# Patient Record
Sex: FEMALE | Race: WHITE
[De-identification: names, ages, dates, MRNs, and addresses within clinical notes are randomized per-mention and may not be internally consistent; named-entity substitution may affect disease eponyms.]

---

## 2017-04-06 ENCOUNTER — HOSPITAL ENCOUNTER (OUTPATIENT)
Dept: HOSPITAL 80 - FIMAGING | Age: 77
End: 2017-04-06
Attending: GENERAL ACUTE CARE HOSPITAL
Payer: COMMERCIAL

## 2017-04-06 DIAGNOSIS — Z12.31: Primary | ICD-10-CM

## 2017-04-06 DIAGNOSIS — Z85.3: ICD-10-CM

## 2018-04-09 ENCOUNTER — HOSPITAL ENCOUNTER (OUTPATIENT)
Dept: HOSPITAL 80 - FIMAGING | Age: 78
End: 2018-04-09
Attending: FAMILY MEDICINE
Payer: COMMERCIAL

## 2018-04-09 DIAGNOSIS — Z90.11: ICD-10-CM

## 2018-04-09 DIAGNOSIS — Z85.3: ICD-10-CM

## 2018-04-09 DIAGNOSIS — Z12.31: Primary | ICD-10-CM

## 2018-09-08 ENCOUNTER — HOSPITAL ENCOUNTER (OUTPATIENT)
Dept: HOSPITAL 80 - FED | Age: 78
Setting detail: OBSERVATION
LOS: 1 days | Discharge: LEFT BEFORE DISCHARGE | End: 2018-09-09
Attending: HOSPITALIST | Admitting: HOSPITALIST
Payer: COMMERCIAL

## 2018-09-08 DIAGNOSIS — I10: ICD-10-CM

## 2018-09-08 DIAGNOSIS — R41.841: ICD-10-CM

## 2018-09-08 DIAGNOSIS — I60.7: Primary | ICD-10-CM

## 2018-09-08 DIAGNOSIS — R20.2: ICD-10-CM

## 2018-09-08 DIAGNOSIS — G83.24: ICD-10-CM

## 2018-09-08 LAB — PLATELET # BLD: 253 10^3/UL (ref 150–400)

## 2018-09-08 PROCEDURE — 70549 MR ANGIOGRAPH NECK W/O&W/DYE: CPT

## 2018-09-08 PROCEDURE — A9585 GADOBUTROL INJECTION: HCPCS

## 2018-09-08 PROCEDURE — 99285 EMERGENCY DEPT VISIT HI MDM: CPT

## 2018-09-08 PROCEDURE — G0378 HOSPITAL OBSERVATION PER HR: HCPCS

## 2018-09-08 PROCEDURE — 70551 MRI BRAIN STEM W/O DYE: CPT

## 2018-09-08 PROCEDURE — 70450 CT HEAD/BRAIN W/O DYE: CPT

## 2018-09-08 PROCEDURE — 97161 PT EVAL LOW COMPLEX 20 MIN: CPT

## 2018-09-08 PROCEDURE — 70544 MR ANGIOGRAPHY HEAD W/O DYE: CPT

## 2018-09-08 PROCEDURE — 93005 ELECTROCARDIOGRAM TRACING: CPT

## 2018-09-08 RX ADMIN — LISINOPRIL SCH MG: 2.5 TABLET ORAL at 17:30

## 2018-09-08 NOTE — EDPHY
H & P


Stated Complaint: Lt arm numbness yesterday lasted approx 1 hour.  No C/o today


Time Seen by Provider: 18 12:27


HPI/ROS: 





Chief Complaint:  Left arm numbness





HPI:  78-year-old woman with no significant medical history had an episode 2 

days ago of numbness in her left forearm and inability to use her left hand.  

This lasted for about 5 min.  Started while she was vacuuming.  She is right-

handed.  She then returned to normal.  Patient had another episode yesterday 

which lasted about the same amount of time.  She does not recall what she was 

doing at that time.  She has not had any symptoms for the last 24 hr.  She 

called her primary care physician's office who instructed her to come to the 

hospital.  Patient states her grandfather  of a stroke years ago.  Denies 

any other medical history.  Takes no medicines.  No recent travel.  No other 

numbness or weakness.  No chest pain shortness of breath.  No fevers or chills.

  No cough.





ROS:  10 systems were reviewed and were negative except those elements noted in 

the HPI.





PMH:  Denies





Social History: No smoking, occasional alcohol,  no recreational drug use





Family History: non-contributory





Physical Exam:


Gen: Awake, Alert, No Distress


HEENT:  


     Nose: no rhinorrhea


     Eyes: PERRLA, EOMI


     Mouth: Moist mucosa 


Neck: Supple, no JVD


Chest: nontender, lungs clear to auscultation


Heart: S1, S2 normal, no murmur


Abd: Soft, non-tender, no guarding


Back: no CVA tenderness, no midline tenderness 


Ext: no edema, non-tender


Skin: no rash


Neuro: CN II-XII intact, Sensation grossly intact, Strength 5/5 in bilateral 

upper and lower extremities, normal finger-nose, normal heel-shin, no facial 

asymmetry





- Personal History


Current Tetanus/Diphtheria Vaccine: Yes


Current Tetanus Diphtheria and Acellular Pertussis (TDAP): Yes





- Medical/Surgical History


Hx Asthma: No


Hx Chronic Respiratory Disease: No


Hx Diabetes: No


Hx Cardiac Disease: No


Hx Renal Disease: No


Hx Cirrhosis: No


Hx Alcoholism: No


Hx HIV/AIDS: No


Hx Splenectomy or Spleen Trauma: No


Other PMH: Breast CA





- Social History


Smoking Status: Never smoked


Constitutional: 


 Initial Vital Signs











Temperature (C)  36.7 C   09/08/18 11:39


 


Heart Rate  70   18 11:39


 


Respiratory Rate  16   18 11:39


 


Blood Pressure  171/85 H  18 11:39


 


O2 Sat (%)  93   18 11:39








 











O2 Delivery Mode               Room Air














Allergies/Adverse Reactions: 


 





No Known Allergies Allergy (Unverified 18 11:39)


 








Home Medications: 














 Medication  Instructions  Recorded


 


Herbals/Supplements -Info Only 1 ea PO DAILY 18


 


Multivitamins [Multivitamin (*)] 1 each PO DAILY 18














Medical Decision Making





- Diagnostics


Imaging: Discussed imaging studies w/ On call Radiologist


ED Course/Re-evaluation: 





CT scan is negative.  Patient is asymptomatic.  Discussed with Glenys Del Cid, 

hospitalist.  Will admit for further evaluation.  She is requesting CT 

angiogram of the head neck.





- Data Points


Laboratory Results: 


 Laboratory Results





 18 11:35 





 18 11:35 








Medications Given: 


 








Discontinued Medications





Aspirin Buffered (Aspirin Ec)  325 mg PO DAILY [facility]


   Stop: 19 14:29


   Last Admin: 18 17:35 Dose:  Not Given


Lisinopril (Zestril)  2.5 mg PO DAILY [facility]


   Stop: 19 16:44


   Last Admin: 18 09:31 Dose:  Not Given


Multivitamins (Tab-A-Sharlene)  1 each PO DAILY [facility]


   Stop: 19 08:59


   Last Admin: 18 09:31 Dose:  Not Given








Departure





- Departure


Disposition: [facility]


Clinical Impression: 


 Transient cerebral ischemia





Condition: Fair

## 2018-09-08 NOTE — CPEKG
Test Reason : OPEN

Blood Pressure : ***/*** mmHG

Vent. Rate : 074 BPM     Atrial Rate : 063 BPM

   P-R Int : 212 ms          QRS Dur : 113 ms

    QT Int : 428 ms       P-R-T Axes : 071 057 023 degrees

   QTc Int : 475 ms

 

Sinus rhythm

Ventricular premature complex

Borderline prolonged ID interval

Probable left atrial enlargement

Nonspecific T abnormalities, anterior leads

Nonspecific intraventricular conduction delay

Confirmed by Rubin Mann ([id number]) on 9/8/2018 8:50:25 PM

 

Referred By:             Confirmed By:Rubin Mann

## 2018-09-08 NOTE — PDGENHP
History and Physical





- Chief Complaint


Acute paresthesia





- History of Present Illness


Primary care provider:  Dr. Asa Medel





HPI:  78-year-old female presents with acute paresthesias characterized as 

unusual sensation located on the ventral aspect of her left forearm extending 

from the elbow to the wrist, with associated left hand paresis and difficulty 

moving digits 3 and 4.  Patient reports onset of symptoms 2 days prior, 

duration of symptoms approximately 5 min.  She reports it occurred while she 

was vacuuming and she does not recall sustaining any trauma to the left upper 

extremity or sleeping on improperly.  After the resolution of symptoms, she re-

experience them 1 day following that, and has been 24 hr since she has 

experienced symptoms at this time.  She contacted her primary care provider 

office seeking an outpatient appointment, and she was instructed to come to the 

emergency department.  She reports that she has never experienced these 

physical symptoms before, and she denies any posterior neck pain or headache.  

She does endorse that further past couple months she has experienced word-

finding difficulty, pervasive enough that she is unable to recall the names of 

friends or places where she has lived.





History Information





- Allergies/Home Medication List


Allergies/Adverse Reactions: 








No Known Allergies Allergy (Unverified 09/08/18 11:39)


 





Home Medications: 








Aspirin [Aspirin 81mg (*)] 81 mg PO Q2D 09/08/18 [Last Taken 09/06/18]


Herbals/Supplements -Info Only 1 ea PO DAILY 09/08/18 [Last Taken Unknown]


Ibuprofen [Motrin (*)] 200 - 400 mg PO Q4-6PRN PRN 09/08/18 [Last Taken Unknown]


Multivitamins [Multivitamin (*)] 1 each PO DAILY 09/08/18 [Last Taken Unknown]





I have personally reviewed and updated: family history, medical history, social 

history, surgical history





- Past Medical History


hypertension


Additional medical history: Skin cancer left side of nose.  Breast cancer with 

radiation therapy 10 years ago





- Surgical History


Additional surgical history: Umbilical hernia repair





- Family History


Additional family history: Second-degree relative with stroke, no family 

history of myocardial infarction or cerebral aneurysms





- Social History


Smoking Status: Never smoked


Alcohol Use: Occasionally


Drug Use: None


Additional social history: Lives independently, relocated to [place] from 

[place] 2 years ago to be closer to family





Review of Systems


Review of Systems: 





ROS: 10pt was reviewed & negative except for what was stated in HPI & below


Neurological: Reports: other (Paresthesias and paresis left upper extremity, 

word-finding difficulty)





Physical Exam


Physical Exam: 

















Temp Pulse Resp BP Pulse Ox


 


 36.7 C   70   16   171/85 H  93 


 


 09/08/18 11:39  09/08/18 11:39  09/08/18 11:39  09/08/18 11:39  09/08/18 11:39











Constitutional: no apparent distress, appears nourished, not in pain


Eyes: PERRL, anicteric sclera, EOMI


Ears, Nose, Mouth, Throat: moist mucous membranes, hearing normal, ears appear 

normal, no oral mucosal ulcers


Cardiovascular: regular rate and rhythym, no murmur, rub, or gallop, No carotid 

bruit, No edema


Respiratory: no respiratory distress, no rales or rhonchi, clear to auscultation


Gastrointestinal: normoactive bowel sounds, soft, non-tender abdomen, no 

palpable masses, No distension


Genitourinary: no renal bruits


Skin: No abrasion (Left upper extremity), No rash


Musculoskeletal: other (Full range of motion left wrist and left elbow without 

any pain elicited)


Neurologic: CN II-XII Intact, other (Subjective paresthesia ventral surface 

left upper extremity, alert awake oriented times 2, patient unable to say her 

location, demonstrates significant word-finding difficulty and distress with 

good insight), No weakness (Motor strength 5/5 bilateral upper and lower 

extremities)


Psychiatric: interacting appropriately, not anxious, No agitated





Lab Data & Imaging Review





 09/08/18 11:35





 09/08/18 11:35














WBC  5.17 10^3/uL (3.80-9.50)   09/08/18  11:35    


 


RBC  5.14 10^6/uL (4.18-5.33)   09/08/18  11:35    


 


Hgb  15.3 g/dL (12.6-16.3)   09/08/18  11:35    


 


Hct  44.5 % (38.0-47.0)   09/08/18  11:35    


 


MCV  86.6 fL (81.5-99.8)   09/08/18  11:35    


 


MCH  29.8 pg (27.9-34.1)   09/08/18  11:35    


 


MCHC  34.4 g/dL (32.4-36.7)   09/08/18  11:35    


 


RDW  13.3 % (11.5-15.2)   09/08/18  11:35    


 


Plt Count  253 10^3/uL (150-400)   09/08/18  11:35    


 


MPV  8.8 fL (8.7-11.7)   09/08/18  11:35    


 


Neut % (Auto)  66.7 % (39.3-74.2)   09/08/18  11:35    


 


Lymph % (Auto)  22.8 % (15.0-45.0)   09/08/18  11:35    


 


Mono % (Auto)  6.8 % (4.5-13.0)   09/08/18  11:35    


 


Eos % (Auto)  2.5 % (0.6-7.6)   09/08/18  11:35    


 


Baso % (Auto)  0.8 % (0.3-1.7)   09/08/18  11:35    


 


Nucleat RBC Rel Count  0.0 % (0.0-0.2)   09/08/18  11:35    


 


Absolute Neuts (auto)  3.45 10^3/uL (1.70-6.50)   09/08/18  11:35    


 


Absolute Lymphs (auto)  1.18 10^3/uL (1.00-3.00)   09/08/18  11:35    


 


Absolute Monos (auto)  0.35 10^3/uL (0.30-0.80)   09/08/18  11:35    


 


Absolute Eos (auto)  0.13 10^3/uL (0.03-0.40)   09/08/18  11:35    


 


Absolute Basos (auto)  0.04 10^3/uL (0.02-0.10)   09/08/18  11:35    


 


Absolute Nucleated RBC  0.00 10^3/uL (0-0.01)   09/08/18  11:35    


 


Immature Gran %  0.4 % (0.0-1.1)   09/08/18  11:35    


 


Immature Gran #  0.02 10^3/uL (0.00-0.10)   09/08/18  11:35    


 


Sodium  140 mEq/L (135-145)   09/08/18  11:35    


 


Potassium  4.5 mEq/L (3.3-5.0)   09/08/18  11:35    


 


Chloride  104 mEq/L ()   09/08/18  11:35    


 


Carbon Dioxide  28 mEq/l (22-31)   09/08/18  11:35    


 


Anion Gap  8 mEq/L (8-16)   09/08/18  11:35    


 


BUN  24 mg/dL (7-23)  H  09/08/18  11:35    


 


Creatinine  0.9 mg/dL (0.6-1.0)   09/08/18  11:35    


 


Estimated GFR  60   09/08/18  11:35    


 


Glucose  88 mg/dL ()   09/08/18  11:35    


 


Calcium  10.1 mg/dL (8.5-10.4)   09/08/18  11:35    








Visualized and Interpreted imaging results: Yes


Interpretation: Head CT demonstrating atrophy, no previous CVA





Assessment & Plan


Assessment: 








78-year-old female presents with acute paresthesias and paresis suggestive of 

TIA versus CVA








Plan: 





1.  Paresis and paresthesia.  Acute, new problem this provider, further workup 

indicated.  Potential diagnoses include TIA versus CVA versus a posterior 

cervical cord compression, given the patient's present and persistent nature of 

word-finding difficulties, I suspect the patient may have experienced subacute 

CVA, but dementia is also within the differential, particularly if she were to 

have vascular dementia


-patient declined head CT with IV contrast but she has elected for MRA of head 

and neck, MRI


-she is agreeable to echocardiogram


-lipid panel, hemoglobin A1c


-monitor on telemetry given potential for predisposing AFib, with patient 

reporting a history of right chest palpitations evaluated by her PCP but no 

formal cardiology evaluation the outpatient setting


-aspirin 325


-get neuro consultation tomorrow after above studies obtained





2. High blood pressure.  Patient denies history of hypertension, her systolic 

blood pressure is currently 170, will monitor, may be elevated in the setting 

of CVA





Diet.  Regular after swallow eval





Prophylaxis.  High risk patient, SCDs, hold pharm given possible CVA





Code.  Full





Disposition.  Anticipated discharge is 9/9, pending further workup of above.





Patient's case discussed with Dr. Paul Alva, he and I both agree that further 

neurovascular workup is appropriate.

## 2018-09-09 VITALS — SYSTOLIC BLOOD PRESSURE: 132 MMHG | DIASTOLIC BLOOD PRESSURE: 71 MMHG

## 2018-09-09 RX ADMIN — LISINOPRIL SCH: 2.5 TABLET ORAL at 09:31

## 2018-09-09 NOTE — PDDCSUM
Discharge Summary


Discharge Summary: 


DISCHARGE SUMMARY





FOLLOW-UP ITEMS: 


1. Perform mini-mental status exam as an outpatient PCP office


2. Recommend driving exam and suspension of license until patient is able to 

perform


2. Recommend outpatient neurology consultation 





DATE OF ADMISSION:  9/8/2018


DATE OF DISCHARGE:  9/9/2018





DISCHARGE DIAGNOSES:


1. Suspected amyloid angiopathy and subacute subarachnoid hemorrhages


2. Acute paresis, paresthesia, word-finding difficulties 





CONSULTATIONS:


Neurosurgery, patient left prior to Neurology consultation





PROCEDURES / IMAGING:


Brain MRI demonstrating diffuse bilateral hemosiderin deposits potentially 

consistent with subacute or old subarachnoid hemorrhages without acute CVA


MRA of head and neck demonstrating no cerebral aneurysms





CHIEF COMPLAINT:


Acute left upper extremity paresthesias and paresis with word-finding difficulty





SUBJECTIVE:


Patient became very distressed and paranoid, leaving against medical advice 

prior to neurology exam





PHYSICAL EXAM ON DISCHARGE:


Systolic blood pressure 130, heart rate 80, afebrile overnight, satting on room 

air, alert awake oriented times 3 to person place and time but requiring 

prompting and demonstrating a very circuitous way of arriving at her answers; 

she has obvious word-finding difficulty, unable to name her address or City 

where she lives, but able to problem solve by getting into her purse and 

locating the address in 1 of her pocket books; moving all 4 extremities; facial 

symmetry; labile emotions, seemingly paranoid, but demonstrates linear and 

directable thinking





LABS ON DISCHARGE:


, A1c pending at time of discharge





HOSPITAL COURSE BY PROBLEM:


The patient presented with left upper extremity paresis and paresthesias of 

abrupt onset 3 days prior to presentation.  She also reported word-finding 

difficulty which has been present for at least a month.  She attempted to see 

her primary care provider, but was triaged to the emergency department for 

evaluation.  The patient initially underwent a TIA versus CVA workup, and brain 

MRI demonstrated diffuse bilateral hemosiderin deposits potentially consistent 

with subacute or old subarachnoid hemorrhages.  MRA of the head and neck were 

otherwise unremarkable.  She was monitored on neuro checks, which demonstrated 

resolution of her paresthesias and paresis, but persistence of her word-finding 

difficulty as well as development of paranoia and distress out of proportion to 

the patient's situation.  She became increasingly agitated and demanded to be 

discharged.  I evaluated the patient and after an extensive conversation, I 

felt like she did have the capacity to make rational decisions, although her 

word-finding difficulty was present and pervasive on exam, revealing that she 

did have intact problem solving capabilities, as she would look for the words 

or answers to questions on collateral material such as her pocketbook which was 

in her purse.  At the time of my evaluation, the patient was cooperative and 

agreed to a Neurology consultation prior to discharge, as I shared with her the 

diagnosis of subacute subarachnoid hemorrhages which could either be secondary 

to amyloid angiopathy verses vasculitis, as suggested by Neurosurgery.  Assured 

with the patient that there was no recommended neurosurgical intervention, but 

that neurology evaluation was necessary to help her determine the next best 

steps.  After agreeing to this consultation prior to discharge, the patient 

then subsequently decided to leave against medical advice, prior to the 

Neurology evaluation.





My suspicion is that the patient has an amyloid angiopathy resulting in 

subacute subarachnoid hemorrhages, resulting in her aforementioned neurologic 

and personality symptoms.  The patient endorsed that she has a sister in 

[facility] who has very similar symptoms, and it is certainly possible that there 

may be a genetic component.  Although vasculitis is within the differential, 

the patient's normal CBC makes this diagnosis somewhat less likely.  That said, 

she should have inflammatory markers and workup in the outpatient setting, if 

she is amenable to outpatient neurology consultation.  She should also have 

close outpatient follow-up with a mini-mental status exam and consideration for 

the involvement of Adult protection Services if the patient decompensates 

further and appears to be unable to manage her situation independently.  I 

would highly recommend the patient not be driving a motorized vehicle for her 

own safety and that of others, as she is at risk for losing her way and 

becoming easily overwhelmed.  I would also highly recommend the patient stop 

taking aspirin every other day, as she does not have an absolute indication for 

this medication and she is at risk for worsening intracranial bleeding.





It should be noted that I was unable to provide the patient with some of these 

aforementioned recommendations, as my intention had been to continue my 

conversation with the patient after she had been seen by Neurology, which was 

consultation she had agreed to during our encounter.  Consequently, I recommend 

that the patient's primary care provider office reach out to the patient 

immediately to arrange outpatient follow-up and  her on these 

recommendations.





DISCHARGE MEDICATIONS:


Please see official discharge medication reconciliation sheet in chart , 

continue home medications with the exception of aspirin.





DISCHARGE INSTRUCTIONS:


Please follow up with her primary care provider as soon as possible.

## 2018-09-09 NOTE — GCON
DATE OF CONSULTATION:  09/09/2018



REASON FOR CONSULTATION:  Acute paraesthesias and confusion, with new cranial 
petechial hemorrhages noted.



HOSPITAL COURSE/HISTORY/MAJOR MEDICAL FINDINGS:  The patient is a 78-year-old 
female, from whom some of this has been taken from the patient's medical record 
as the patient has had some confusion this morning and states that she does not 
fully understand why she was asked to come to the hospital.  Per her record, 
the patient was having some acute paresthesias in her left arm extending from 
her elbow to her wrist.  This had been going on for approximately 2 days prior, 
and each time it would last about 5 minute.  She states that this would occur 
when she was vacuuming and she denied any trauma.  The patient does state that 
she contacted her primary care physician, and she was then directed to come to 
the emergency room.  She had never experienced any arm numbness, tingling or 
pain prior to this.  She denies any neck pain.  She has also noticed that she 
has been having some word-finding difficulty.  The patient denies any headaches
, nausea, vomiting, or dizziness this morning.



REVIEW OF SYSTEMS:  Review of systems is negative other than what is stated in 
the HPI.  Please see pertinent negatives and positives.



HOME MEDICATIONS:  Include aspirin, herbal supplements, ibuprofen, multivitamin.



ALLERGIES:  No known drug allergies.



FAMILY HISTORY:  Most of her family lives in [place].  She denies any history 
of cancer or diabetes.  She does have someone in her family that has had a 
prior stroke.



PAST SURGICAL HISTORY:  The patient states that she had skin cancer removed 
from her nose recently and had an umbilical hernia repair.



PAST MEDICAL HISTORY:  Significant for the skin cancer as well as breast cancer 
with radiation and hypertension.



SOCIAL HISTORY:  The patient is originally from [facility] but she now lives 
independently here in [place].  She has never smoked and she has an occasional 
alcoholic beverage.



PHYSICAL EXAMINATION:  VITAL SIGNS:  BP is 132/71, heart rate is 87, she is 91% 
on room air, temperature is 37.7.  GENERAL:  The patient is in no acute 
distress.  She is alert and she is oriented to year, but throughout the 
conversation with the patient, she does perseverate and is not able to fully 
recall the full reason that she was sent to the hospital nor some detailed 
history questionnaires.  She had tried to order breakfast; however, she states 
that when she called for breakfast that they were closed.  All of her 
statements are not fully appropriate.  HEENT:  RANDY.  EOMI.  EXTREMITIES:  
The patient is a 5/5 and equal in her bilateral upper and bilateral lower 
extremities including her deltoids, triceps, biceps, wrist flexors, extensors, 
interossei, intrinsic , iliopsoas, hamstrings, quadriceps, plantar flexion, 
dorsiflexion, and EHL.



DIAGNOSTIC REVIEW:  The patient underwent a head CT which demonstrated no acute 
hemorrhage or intracranial abnormality; there is atrophy and nonspecific white 
matter disease. 



There was evidence of a perivascular space versus old lacunar infarct present 
within the right basal ganglia.  There was minimal focal encephalomalacia 
involving the low left paramedian frontal lobe. 



Brain MRI demonstrated multiple diffuse bilateral cerebral hemodesrin deposits 
consistent with subacute to old subarachnoid hemorrhages and cortical 
hemorrhages which may be relative to amyloid angiopathy.  There is severe 
microvascular gliosis and moderate cerebral atrophy. 



MRA of the brain demonstrated findings that were consistent with the prior MRI.
  There was no evidence of aneurysm, vascular malformation, or flow-limiting 
stenosis or occlusion. 



Cervical MRA was also negative for any flow-limiting stenosis, occlusion or 
dissection.



ASSESSMENT AND PLAN:  The patient is a 78-year-old female who presented with 
some left arm intermittent paresthesias that would come and go.  She is not 
having any of that this morning; she does have some confusion, however, that 
was noted throughout my lengthy conversation with the patient.  There is 
evidence of some hemodesrin spots on her MRI which may be consistent with an 
amyloid angiopathy per the radiology reports.  The patient was seen both by Dr. Stewart and myself.  At this point in time, there is no acute neurosurgical 
etiology, no acute neurosurgical treatment recommended; would recommend 
neurology evaluation for further workup.  PT, OT, and SLP cog evaluations.  



The patient was seen by both Dr. Stewart and myself.





Job #:  [id number]

MTDD

## 2018-09-09 NOTE — PDCONSULT
Consultant Note: 





PATIENT LEFT AGAINST MEDICAL ADVICE (AMA)


BECAUSE OF THIS, I WAS UNABLE TO EVALUATE HER

## 2018-09-10 ENCOUNTER — HOSPITAL ENCOUNTER (OUTPATIENT)
Dept: HOSPITAL 80 - FED | Age: 78
Setting detail: OBSERVATION
LOS: 1 days | Discharge: HOME | End: 2018-09-11
Attending: INTERNAL MEDICINE | Admitting: INTERNAL MEDICINE
Payer: COMMERCIAL

## 2018-09-10 DIAGNOSIS — Z92.3: ICD-10-CM

## 2018-09-10 DIAGNOSIS — R93.0: ICD-10-CM

## 2018-09-10 DIAGNOSIS — R20.2: Primary | ICD-10-CM

## 2018-09-10 DIAGNOSIS — R20.0: ICD-10-CM

## 2018-09-10 DIAGNOSIS — Z85.3: ICD-10-CM

## 2018-09-10 DIAGNOSIS — H81.399: ICD-10-CM

## 2018-09-10 LAB
INR PPP: 1.07 (ref 0.83–1.16)
PLATELET # BLD: 239 10^3/UL (ref 150–400)
PROTHROMBIN TIME: 14.1 SEC (ref 12–15)

## 2018-09-10 PROCEDURE — 93306 TTE W/DOPPLER COMPLETE: CPT

## 2018-09-10 PROCEDURE — 97165 OT EVAL LOW COMPLEX 30 MIN: CPT

## 2018-09-10 PROCEDURE — 70450 CT HEAD/BRAIN W/O DYE: CPT

## 2018-09-10 PROCEDURE — 93005 ELECTROCARDIOGRAM TRACING: CPT

## 2018-09-10 PROCEDURE — 97161 PT EVAL LOW COMPLEX 20 MIN: CPT

## 2018-09-10 PROCEDURE — 92523 SPEECH SOUND LANG COMPREHEN: CPT

## 2018-09-10 PROCEDURE — G0378 HOSPITAL OBSERVATION PER HR: HCPCS

## 2018-09-10 PROCEDURE — 96372 THER/PROPH/DIAG INJ SC/IM: CPT

## 2018-09-10 PROCEDURE — 99285 EMERGENCY DEPT VISIT HI MDM: CPT

## 2018-09-10 NOTE — CPEKG
Test Reason : OPEN

Blood Pressure : ***/*** mmHG

Vent. Rate : 066 BPM     Atrial Rate : 066 BPM

   P-R Int : 221 ms          QRS Dur : 103 ms

    QT Int : 411 ms       P-R-T Axes : 066 043 014 degrees

   QTc Int : 431 ms

 

Sinus rhythm

Prolonged AL interval

Probable left atrial enlargement

 

Confirmed by McCollester, Laughlin ([id number]) on 9/10/2018 10:11:00 PM

 

Referred By:             Confirmed By:Laughlin McCollester

## 2018-09-10 NOTE — PDGENHP
History and Physical


History and Physical: 





CC:  Numbness in left arm and in left tongue





HISTORY:  This patient comes into the emergency room today complaining of an 

episode of now resolved numbness in her left arm and hand and left tongue.  She 

actually had been admitted here 2 days ago with pretty much identical symptoms 

that had resolved.  There were 2 episodes on that day of similar symptoms.  

There was no headache, no cardiac symptoms and no significant findings on exam.

  She had examination including CT scan head MRI of the brain MR angio of head 

and neck.  The most concerning finding at the time on imaging was evidence of 

several subacute and/or chronic small hemorrhage areas in the brain, raising a 

question of possible amyloid angiopathy or other cause.  There was atrophy but 

no acute ischemia or other acute changes.  The patient was seen by Neurosurgery 

who did not feel there is a neurosurgical issue.  She was to be seen by 

Neurology but the patient left against medical advice before being seen by 

neurologist.  There were no changes in her medications.  The patient does 

clinically take an aspirin a day.





While out shopping with her son today in a grocery store the patient had onset 

of the same left arm and hand numbness and left tongue numbness.  It is not 

possible for me to clarify from talking is patient whether there was actually 

any weakness but as best I can tell there was no actual weakness in the hand or 

arm.  It sounds like the son was concerned that her speech did not sound quite 

normal, somewhat slurred, but the patient does not recall having any change in 

the sound of her voice or any other difficulty speaking.  She tells me that 

today's episode as well as the episodes 2 days ago all lasted several minutes 

meaning 10-15 minutes, with then complete resolution.  Again today she denies 

headache, change in vision, confusion, palpitations, fever symptoms, or other 

associated changes.





She gives no prior history of stroke or TIA.  She describes what sounds like 1 

past episode of peripheral vertigo that was successfully treated with Epley 

maneuvers.








ROS:  A comprehensive 10 system review revealed no other significant findings








PAST MEDICAL HISTORY:


Peripheral vertigo


Breast cancer status post radiation 10 years out


Umbilical hernia repair


Basal cell cancer of the nose








FAMILY MEDICAL HISTORY:


Second-degree relative with a stroke but no other family history of cardiac 

vascular or cerebrovascular disease





SOCIAL HISTORY:


Lives alone, has family in the area


No tobacco alcohol or street drugs





MEDICATIONS:


The patients list has been reconciled by our clinical pharmacist in the EMR.  I 

have reviewed the list and ordered appropriate medicines.





PHYSICAL EXAMINATION:


Vital Signs:  Mildly hypertensive otherwise normal


Cardiac Monitor:  Sinus





Examination:


General: alert, oriented, good mentation, relaxed


Skin: warm, dry, good color, no rash


HEENT: normal


Neck: no mass or jvd


Resps: relaxed


Lungs: clear breath sounds


Heart: regular, no murmur


Abdomen: soft, nondistended, nontender, +BS, no mass


Upper Extremities: normal


Lower Extremities: no edema, warm


No Bleeding or bruising


Neurologic:  No numbness or loss of sensation demonstrable on examination 

anywhere at this time; normal speech/language, normal CNs, no focal weakness, 

no pronator drift, no abnormal reflexes





IV site: looks normal








LABORATORY DATA:


Unremarkable CBC and metabolic panel


Normal troponin





RADIOLOGY STUDIES:


I reviewed images of her CT scan of the head today along with radiologist 

report.  There is atrophy and some microvascular gliosis.  Radiologist mentions 

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 

see what he is referring to their but I do not see anything that looks like 

ischemia that would be acute or subacute.


I reviewed the images of her brain MRI from 2 days ago.  I do not see anything 

that looks like acute ischemia.  I do see the areas of hemosiderin deposit 

mentioned by the radiologist from this study.





12 LEAD EKG:


I reviewed her ER EKG tracing today, shows sinus rhythm with first-degree AV 

block, nonischemic





ASSESSMENT:


* recurrent episode of acute left-sided numbness resolved; unremarkable 

neurologic exam at this time


* subacute to chronic hemosiderin deposits suggesting small hemorrhages, 

question amyloid angiopathy or other cause


* no evidence of acute stroke on today's CT scan or on previous images 2 days 

ago with CTs and MRs


* no evidence of vascular abnormalities amenable to any interventions based on 

MR angios from 2 days ago


* LDL cholesterol 124








The cause of her episodes is uncertain.  At this point I will need neurologic 

assessment and Dr. Sanchez is where the patient and will see her in consultation.  

At this time he is recommending that we not start aspirin until he has a chance 

to look at her examination, study her symptoms and her imaging.





PLANS:


* Observation status on stroke unit


* Dr. Sanchez will consult on the patient


* Will start some statin for her hyperlipidemia at this time


* Hold on aspirin therapy until seen by Dr. Sanchez


* Cardiac EKG monitoring


* Follow blood pressures closely


* Therapy evaluations


* As her symptoms has resolved and are swallow assessment here at the bedside 

looks good will allow her to eat at this time








I have reviewed the patient's case in detail with Dr. Ron Ruiz


I have reviewed the patient's past medical records as part of this assessment, 

including previous hospital admission records

## 2018-09-10 NOTE — EDPHY
H & P


Time Seen by Provider: 09/10/18 18:05


HPI/ROS: 





Chief complaint.  Numbness





HPI.  Patient is 78-year-old female who with complaint of left arm, left fingers

, tongue with altered sensation and numbness beginning at about 5:00 p.m. 

Today.  It lasted maybe 1 hr.  Symptoms have resolved.  She thinks she was 

clumsy with her left hand and could not make a fist or worker seatbelt.  Her 

son noted that her speech seemed to be slurred.  She had no leg symptoms.  No 

chest discomfort or trouble breathing.  No abdominal pain.  Patient was 

admitted for left arm numbness 2 days ago.  She had a normal head C T. Brain 

MRI showed subacute to old subarachnoid hemorrhage and cortical hemorrhage but 

nonacute.  There was also severe microvascular ischemic gliosis.  Her head MRA 

was negative for the [place].  Her neck MRA showed no stenosis.  She 

was evaluated by Neurosurgery who felt there was no need for neurosurgical 

intervention.  Neurology consult was ordered however the patient left AMA prior 

to neurology evaluation.  Discharge diagnosis was amyloid angiopathy and sub 

acute subarachnoid hemorrhages.  She has no symptoms now





ROS


Constitutional.  no fever/chills, no weakness


Eyes.  no problems with vision


ENT.  no sore throat, no nasal drainage


Cardiovascular.  no chest pain


Respiratory.  no shortness of breath, no cough


Abdominal.  no abdominal pain, no nausea/vomiting, no diarrhea


.  no problems urinating


MS.  no calf pain/swelling, no neck/back pain, no joint pain


Skin.  no rash


Lymph.  no swollen glands


Neuro.  Left arm and fingers and time numbness and potential weakness with the 

left hand 


Past Medical/Surgical History: 





Past medical history breast cancer, right mastectomy, TIA


Social History: 





Single, nonsmoker, no alcohol


Smoking Status: Never smoked


Physical Exam: 





General Appearance:  Alert pleasant well-developed female mild distress vital 

signs significant for blood pressure 167/107


Eyes: Pupils equal and round no pallor or injection.


ENT, Mouth:  Mucous membranes are moist.


Respiratory:  There are no retractions, lungs are clear to auscultation.


Cardiovascular: Regular rate and rhythm.


Gastrointestinal:   Abdomen is soft and nontender, no masses, bowel sounds 

normal.


Neurological:  Awake and alert, sensory and motor exams grossly normal.  No 

facial numbness now.  Speech is normal.  Cranial nerves are intact.  There is 

no pronator drift.  Finger-to-nose and heel-to-shin are intact bilaterally


Skin: Warm and dry, no rashes.


Musculoskeletal:  Neck is supple nontender.


Extremities  symmetrical, full range of motion.


Psychiatric: Patient is oriented X 3, there is no agitation.


Constitutional: 


 Initial Vital Signs











Temperature (C)  37.0 C   09/10/18 17:53


 


Heart Rate  67   09/10/18 17:53


 


Respiratory Rate  16   09/10/18 17:53


 


Blood Pressure  167/107 H  09/10/18 17:53


 


O2 Sat (%)  97   09/10/18 17:53








 











O2 Delivery Mode               Room Air














Allergies/Adverse Reactions: 


 





No Known Allergies Allergy (Unverified 09/08/18 11:39)


 








Home Medications: 














 Medication  Instructions  Recorded


 


Herbals/Supplements -Info Only 1 ea PO DAILY 09/08/18


 


Multivitamins [Multivitamin (*)] 1 each PO DAILY 09/08/18


 


Aspirin  09/10/18














Medical Decision Making





- Diagnostics


EKG Interpretation: 





EKG interpreted by me shows normal sinus rhythm normal interval and axis.  QRS 

is normal there is no significant ST elevation or depression.  No arrhythmia.  

The rate is 66


Imaging Results: 


 Imaging Impressions





Head CT  09/10/18 18:38


Impression:  Ventricular and deep hemispheric white matter change, which is 

stable in appearance and can be seen with small vessel ischemic disease.  

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 

superiorly, similar to the recent CT and MRI.  No definite acute findings.


 


Results called and discussed with Ron Ruiz M.D., on September 10, 2018 

at 1918.








Noncontrast head CT reviewed by me and discussed with Dr. Mullins shows the no 

acute infarct or hemorrhage.  Similar to 2 days ago.


Procedures: 





IV normal saline, monitor


ED Course/Re-evaluation: 





Re-evaluation 7:25 p.m. Patient again has facial and left arm numbness.  I can'

t demonstrate weakness.  She also complains of some numbness and pain to her 

chest.





I consulted discussed case with Dr. Pond for Neurology who agrees with admission 

and he will see the patient in consultation.  He recommends no aspirin 

currently.  He recommends gabapentin 300 mg.





I consulted and discussed the case with Dr. Man, hospitalist, who agrees to 

the admission





I have discussed imaging and lab results with the patient and her son.  We 

discussed treatment plan including recommendation for admission.  They 

expressed understanding


Differential Diagnosis: 





Patient with TIA type symptoms 2 days ago and workup showing subacute 

subarachnoid hemorrhages.  Patient left AMA prior to Neurology evaluation.  

Returns today with stuttering and continuing symptoms.  No evidence for acute 

hemorrhage





- Data Points


Laboratory Results: 


 Laboratory Results





 09/10/18 17:45 





 09/10/18 17:45 





 











  09/10/18 09/10/18 09/10/18





  19:22 18:06 17:45


 


WBC      





    


 


RBC      





    


 


Hgb      





    


 


POC Hgb    14.3 gm/dL gm/dL  





    (12.6-16.3)  


 


Hct      





    


 


POC Hct    42 % %  





    (38-47)  


 


MCV      





    


 


MCH      





    


 


MCHC      





    


 


RDW      





    


 


Plt Count      





    


 


MPV      





    


 


Neut % (Auto)      





    


 


Lymph % (Auto)      





    


 


Mono % (Auto)      





    


 


Eos % (Auto)      





    


 


Baso % (Auto)      





    


 


Nucleat RBC Rel Count      





    


 


Absolute Neuts (auto)      





    


 


Absolute Lymphs (auto)      





    


 


Absolute Monos (auto)      





    


 


Absolute Eos (auto)      





    


 


Absolute Basos (auto)      





    


 


Absolute Nucleated RBC      





    


 


Immature Gran %      





    


 


Immature Gran #      





    


 


PT      





    


 


INR      





    


 


POC Sodium    140 mEq/L mEq/L  





    (135-145)  


 


Sodium      137 mEq/L mEq/L





     (135-145) 


 


POC Potassium    4.0 mEq/L mEq/L  





    (3.3-5.0)  


 


Potassium      4.4 mEq/L mEq/L





     (3.3-5.0) 


 


POC Chloride    105 mEq/L mEq/L  





    ()  


 


Chloride      102 mEq/L mEq/L





     () 


 


Carbon Dioxide      26 mEq/l mEq/l





     (22-31) 


 


Anion Gap      9 mEq/L mEq/L





     (8-16) 


 


POC BUN    23 mg/dL mg/dL  





    (7-23)  


 


BUN      23 mg/dL mg/dL





     (7-23) 


 


Creatinine      0.9 mg/dL mg/dL





     (0.6-1.0) 


 


POC Creatinine    1.0 mg/dL mg/dL  





    (0.6-1.0)  


 


Estimated GFR      60 





    


 


Glucose      90 mg/dL mg/dL





     () 


 


POC Glucose    90 mg/dL mg/dL  





    ()  


 


Calcium      9.5 mg/dL mg/dL





     (8.5-10.4) 


 


POC Troponin I  0.00 ng/mL ng/mL    





   (0.00-0.08)   














  09/10/18 09/10/18





  17:45 13:55


 


WBC  5.20 10^3/uL 10^3/uL  





   (3.80-9.50)  


 


RBC  4.93 10^6/uL 10^6/uL  





   (4.18-5.33)  


 


Hgb  14.3 g/dL g/dL  





   (12.6-16.3)  


 


POC Hgb    





   


 


Hct  43.2 % %  





   (38.0-47.0)  


 


POC Hct    





   


 


MCV  87.6 fL fL  





   (81.5-99.8)  


 


MCH  29.0 pg pg  





   (27.9-34.1)  


 


MCHC  33.1 g/dL g/dL  





   (32.4-36.7)  


 


RDW  13.3 % %  





   (11.5-15.2)  


 


Plt Count  239 10^3/uL 10^3/uL  





   (150-400)  


 


MPV  8.5 fL L fL  





   (8.7-11.7)  


 


Neut % (Auto)  58.0 % %  





   (39.3-74.2)  


 


Lymph % (Auto)  30.4 % %  





   (15.0-45.0)  


 


Mono % (Auto)  7.3 % %  





   (4.5-13.0)  


 


Eos % (Auto)  3.1 % %  





   (0.6-7.6)  


 


Baso % (Auto)  1.0 % %  





   (0.3-1.7)  


 


Nucleat RBC Rel Count  0.0 % %  





   (0.0-0.2)  


 


Absolute Neuts (auto)  3.02 10^3/uL 10^3/uL  





   (1.70-6.50)  


 


Absolute Lymphs (auto)  1.58 10^3/uL 10^3/uL  





   (1.00-3.00)  


 


Absolute Monos (auto)  0.38 10^3/uL 10^3/uL  





   (0.30-0.80)  


 


Absolute Eos (auto)  0.16 10^3/uL 10^3/uL  





   (0.03-0.40)  


 


Absolute Basos (auto)  0.05 10^3/uL 10^3/uL  





   (0.02-0.10)  


 


Absolute Nucleated RBC  0.00 10^3/uL 10^3/uL  





   (0-0.01)  


 


Immature Gran %  0.2 % %  





   (0.0-1.1)  


 


Immature Gran #  0.01 10^3/uL 10^3/uL  





   (0.00-0.10)  


 


PT    14.1 SEC SEC





    (12.0-15.0) 


 


INR    1.07 





    (0.83-1.16) 


 


POC Sodium    





   


 


Sodium    





   


 


POC Potassium    





   


 


Potassium    





   


 


POC Chloride    





   


 


Chloride    





   


 


Carbon Dioxide    





   


 


Anion Gap    





   


 


POC BUN    





   


 


BUN    





   


 


Creatinine    





   


 


POC Creatinine    





   


 


Estimated GFR    





   


 


Glucose    





   


 


POC Glucose    





   


 


Calcium    





   


 


POC Troponin I    





   











Point of Care Test Results: 


 Chemistry











  09/10/18 09/10/18





  19:22 18:06


 


POC Sodium    140 mEq/L mEq/L





    (135-145) 


 


POC Potassium    4.0 mEq/L mEq/L





    (3.3-5.0) 


 


POC Chloride    105 mEq/L mEq/L





    () 


 


POC BUN    23 mg/dL mg/dL





    (7-23) 


 


POC Creatinine    1.0 mg/dL mg/dL





    (0.6-1.0) 


 


POC Glucose    90 mg/dL mg/dL





    () 


 


POC Troponin I  0.00 ng/mL ng/mL  





   (0.00-0.08)  








 ISTAT H&H











  09/10/18





  18:06


 


POC Hgb  14.3 gm/dL gm/dL





   (12.6-16.3) 


 


POC Hct  42 % %





   (38-47) 














Departure





- Departure


Disposition: [facility] Inpatient Acute


Clinical Impression: 


 Paresthesia





Condition: Fair


Referrals: 


Asa Medel DO [Primary Care Provider] - As per Instructions

## 2018-09-11 VITALS — SYSTOLIC BLOOD PRESSURE: 123 MMHG | DIASTOLIC BLOOD PRESSURE: 68 MMHG

## 2018-09-11 NOTE — GDS
DIAGNOSES:  

1.  Left face and arm numbness, recurrent.

2.  Peripheral vertigo.

3.  Breast cancer, status post radiation many years ago.

4.  Possible cognitive decline.



CONSULTATIONS:  Neurology, Dr. Harrison Sanchez



PROCEDURES DONE:  CT of the head without contrast showing no acute bleeding.



HOSPITAL COURSE:  The patient is a 78-year-old woman who was recently admitted here for similar compl
aint.  She had a full evaluation from a neurologic standpoint; however, at that time, it was felt she
 may have some amyloid angiography causing some mild bleeding as a source of her left arm numbness.  
Neurosurgical felt that there is no acute issue to address and before Neurology was able to evaluate 
her, she left AMA.  She returns today with similar complaints.  It was felt that a re-evaluation was 
not indicated; however, we did do an echocardiogram, which was not done prior that was normal.  Neuro
logy was able to see her before she left today and felt that again this is likely related to her prev
ious episode and does not represent a new stroke or TIA.  [facility]  He recommends we start atorvastatin and gabapentin daily.  He does not recommend aspirin giv
en her history of amyloid angiopathy.  



Patient has been stable throughout her stay here.  She has had no recurrent symptoms; however, she is
 quite paranoid and had difficulty understanding our treatment plan.  I did discuss this with her son
, who felt that she was at baseline and that English is her second language, and she has always been 
somewhat paranoid.  Dr. Sanchez would be happy to follow up with her as an outpatient regarding her cogn
itive issues, as well as the neurologic issues.



CONDITION ON DISCHARGE:  Good.  Vital signs are stable.  Neurologic status is stable.



DISCHARGE MEDICATIONS:  Please see discharge medication form.



FOLLOWUP:  She will be discharged home.  She does not need any further therapies at home.  She will f
ollow up with Dr. Harrison Sanchez as an outpatient in a couple of weeks.  In the meantime, I have been a
sked to start her on gabapentin and atorvastatin.  Whether the patient will start these or not will b
e determined.





Job #:  [id number]

## 2018-09-11 NOTE — ECHO
[email or web address]





[address] [address] 

Main: [phone] 



Fax: 



Transthoracic Echocardiogram 

Name:             VU HAYWOOD                              MR#:

[id number]

Study Date:       2018                               Study Time:

02:46 PM

YOB: 1940                               Age:

78 year(s)

Height:           167.6 cm (66 in.)                        Weight:

65.77 kg (145 lb.)

BSA:              1.74 m2                                  Gender:

Female

Examination:      Complete Echo with Agitated Saline       Indication:

?TIA

Image Quality:    Adequate                                 Contrast: 

Requested by:     Payton Lee                             BP:

/

Heart Rate:                                                Rhythm: 

Indication:       ?TIA 



Procedure Staff 

Ultrasound Technician:   Octavia Garcia RD 

Reading Physician:       Pineda Vizcarra MD 

Requesting Provider: 



Conclusions:           Normal size left ventricle.  

Normal global systolic LV function.  

The ejection fraction is visually estimated to be 60 %.  

No regional wall motion abnormality.  

An agitated saline study was performed and was positive for

intracardiac shunting.

There are no significant valvular abnormalities. 

Doppler interrogation of the interatrial septum did not reveal any

evidence of an ASD/ PFO.

No apparent cardiac source for embolism. 



Measurements: 

Chambers                     Valvular Assessment AV/MV

Valvular Assessment TV/PV



Normal                                    Normal

Normal

Name         Value      Range              Name         Value Range

Name           Value Range

Ao Luzmaria (2D): 2.2 cm     (1.4 cm-2.6            AV Vmax:     1.47 m/s

(1 m/s-1.7        TR Vmax:       2.28 mm/s ( - )



cm)                                   m/s)             TR PGmax:

21 mmHg ( - )

IVSd (2D):   1.0 cm (0.6 cm-1.1                AV maxP mmHg ( -

)           syst. PAP: 26 mmHg   ( - )



cm)                AV meanP mmHg ( - )           PV Vmax:

0.77 m/s (0.6 m/s-0.9

LVDd (2D):   4.0 cm     (3.9 cm-5.3            DIXON (VTI):   1.8 cm ( -

)                                m/s)



cm)                MV E Vmax:   0.52 m/s ( - )         PV PGmax:

2  mmHg ( - )

LVDs (2D):   2.8 cm     (2.1 cm-4              MV A Vmax:   0.78 m/s (

- )



cm)                MV E/A:      0.67  ( - )  

LVPWd (2D):  0.9 cm     ( - )              MV PHT:      0.091 s ( - )



LVOTd        1.9 cm     1.9 cm mm              MVA (PHT):   2.4 s ( -

)

LVEF (BP):   66 %       (>=55 %)   

Visual EF:   60 % 

RVDd(2D):    2.4 cm     (1.9 cm-3.8 



cmmm)  



Continued Measurements: 

Chambers                     Valvular Assessment AV/MV

Valvular Assessment TV/PV



Patient: VU HAYWOOD                           MRN: [id number]

Study Date: 2018   Page 1 of 2

02:46 PM 









Name                       Value  Name                      Value

Name                      Value

LADs:                    3.0 cm                MV DecTime:

292 m/s      CVP (est.):             5 mmHg

LADs Lon.7 cm                MV E' Septal:

0.07  m/s

LA Area:                 13.9 cm2          MV E/E' Septal:        7.60



LA Volume:               36 ml             MV E/E' Lateral:       6.50



LA Volume Index:         20.7 ml/m2   

RA Area:                 14.7 cm2   



Findings:              Left Ventricle: 

Normal size left ventricle. No LV hypertrophy. Normal global systolic

LV function. The ejection

fraction is visually estimated to be 60 %. No regional wall motion

abnormality.

Right Ventricle: 

Normal size right ventricle. Normal RV function.  

Left Atrium: 

The left atrium is normal in size. An agitated saline study was

performed and was positive for

intracardiac shunting.  

Right Atrium: 

The right atrium is normal in size.  

Mitral Valve: 

The mitral valve is normal in appearance and function. Mild mitral

valve regurgitation is present. No

mitral stenosis is present.  

Aortic Valve: 

The aortic valve is tri-leaflet. No aortic valve stenosis is present.

Aortic regurgitation is at least mild to

moderate.  

Tricuspid Valve: 

The tricuspid valve is normal in appearance and function. Mild

tricuspid regurgitation is present. The

pulmonary artery pressure is normal. Right ventricular systolic

pressure measures 26mmHg.

Pulmonic Valve: 

The pulmonic valve is normal in appearance and function. There is no

pulmonic regurgitation seen.

Aorta: 

The aorta is normal. Normal size aortic root measuring 2.2 cm.  

IVC: 

The IVC is normal sized.  

Pericardium: 

No pericardial effusion. No pleural effusion. 







Electronically signed by Pineda Vizcarra MD on 2018 at 05:27 PM 

(No Signature Object) 



Patient: VU HAYWOOD                           MRN: [id number]

Study Date: 2018   Page 2 of 2

02:46 PM 







[id number]

## 2018-09-11 NOTE — ASMTLACE
LACE

 

Length of stay for            Answers:  2 days                                

current admission                                                             

Acuity / Level of             Answers:  No                                    

Care: Did the patient                                                         

have an inpatient                                                             

admission?                                                                    

Comorbidities - select        Answers:  Cerebrovascular disease               

all that apply                          (CVA, TIA, aneurysms, vasc            

                                        ular dementia)                        

# of Emergency department     Answers:  1-2                                   

visits in the last 6                                                          

months                                                                        

Score: 4

 

Date Signed:  09/11/2018 03:47 PM

Electronically Signed By:GERALDINE Hernandez

## 2018-09-11 NOTE — ASMTCMCOM
CM Note

 

CM Note                       

Notes:

PT rec home, SLP/OT evals pending. Pt left AMA from ED 09/09. CM spoke with pt about d/c 

planning, she reports she wants to d/c independent declines meals on wheels or senior resources. Pt 


son resides close to her, drives her and checks in on her. CM will follow. 

 

Date Signed:  09/11/2018 02:37 PM

Electronically Signed By:GERALDINE Hernandez

## 2018-09-11 NOTE — ASMTCMCOM
CM Note

 

CM Note                       

Notes:

Pt medically stable for d/c with son Jose Luis support. No CM d/c needs identified. 

 

Date Signed:  09/11/2018 03:48 PM

Electronically Signed By:GERALDINE Hernandez

## 2018-09-11 NOTE — NEUROPROG
Assessment: 


[id number]


-


Neurology Consult:


-


CC: Altered Sensation in Left face/arm


-


HPI:


Pt had two episodes (9/8/18 and 9/10/18) of transient left face and arm 

numbness that resolved in 10-15 minutes.  She was admitted on 9/8/18 for her 

initial episode but left the hospital AMA before neurology evaluation occurred.

  Evaluation at that time included brain MRI and MRA which showed no acute 

stroke or bleed but did show old hemorrhages concerning for amyloid angiopathy.

  She was seen by neurosurgery at that time who did not find any neurosurgical 

needs.  She denied headache or cardiac symptoms.  Neurologic exam on 9/11/18 

was nonfocal and unremarkable.  She has not had any return of her symptoms 

since admission.  I felt it was likely she had amyloid angiopathy which had 

caused prior hemorrhages and this likely caused an underlying brain injury 

predisposing her to paresthesias of her left face and arm.  I recommend a trial 

of gabapentin 300 mg qhs for symptom control and f/u in the neurology clinic in 

1-4 weeks to assess response.


-


PMHx: peripheral vertigo, BRCA post radiation, umbilical hernia, basal cell 

cancer of the nose


-


SHx: no tobacco


FHx: stroke


-


ROS: Pt denied acute fever, total vision loss, active severe chest pain, 

respiratory failure, total body severe rash, total bowel/bladder incontinence, 

psychosis, active seizures, or active bleeding


-


O:


VS reviewed


General: Alert


Eyes: Fundoscopic exam not able to visualize optic disks


CV: Heart RRR, no murmur, no carotid bruit


Lungs: Clear to auscultation bilaterally, no rhonchi or rales


Neuro:


- Mental: 


.     Oriented x person/place/date


.     concentration appears normal


.     speech fluency/comprehension normal


.     memory appears normal


.     fund of knowledge appear intact


- Cranial Nerves:


.     II: PERRL, VFFTC


.     III/IV/VI: EOMI, no nystagmus, normal smooth pursuits, no Ptosis


.     V: facial sensation intact to LT


.     VII: face symmetric to eye closure and smile


.     VIII: hearing intact to conversation


.     IX/X: uvula raises symmetrically


.     XI: SCM 5/5 B/L strength


.     XII: tongue protrudes midline w/nl strength


- Motor: 


.     Tone: normal tone in all 4 extremity


.     Strength: no pronator drift, strength 5/5 throughout (B/L delt, bic, tri, 

hand , hf/he, df/pf)


- Reflexes: B/L bic 2/4


- Sensory: all 4 extremity intact to light touch


- Coord: finger-to-nose wnl, LYNN wnl, heel-to-shin wnl


- Gait: deferred


-


Labs:


9/9/18- , H1AC 5.4


9/10/18- CBC wnl, INR 1.07, Chem wnl, 


-


Rads:


9/8/18- Brain MRI w/o con: Multiple diffuse bilateral cerebral hemosiderin 

deposits consistent with subacute to old subarachnoid hemorrhages and cortical 

hemorrhages, which may be related to amyloid angiopathy among other etiologies; 

no acute stroke, mod atrophy, severed CMVD


-


9/8/18- Brain/Neck MRA: unremarkable


-


9/10/18- Head CT: no acute bleed or other acute changes (I personally 

visualized the images on 9/11/18)


-


Assessment:


1. Recurrent episodes of left face/arm numbness: Pt had two episodes (9/8/18 

and 9/10/18) of transient left face and arm numbness that resolved in 10-15 

minutes.  Brain MRI and MRA on 9/8/18 showed no acute stroke or bleed but did 

show old hemorrhages concerning for amyloid angiopathy.  Neurologic exam on 9/11 /18 was nonfocal and unremarkable.  I felt it was likely she had amyloid 

angiopathy which had caused prior hemorrhages and this likely caused an 

underlying brain injury predisposing her to paresthesias of her left face and 

arm.


-


2. Probably Amyloid Angiopathy: Brain MRI w/o con on 9/8/18 showed Multiple 

diffuse bilateral cerebral hemosiderin deposits consistent with subacute to old 

subarachnoid hemorrhages and cortical hemorrhages, which may be related to 

amyloid angiopathy among other etiologies; brain/neck MRA unremarkable for 

vascular abnormalities so amyloid angiopathy seems the most likely cause for 

her MRI findings; Treatment is supportive and avoiding things that increase 

bleed risk if possible.  Patient has seen neurosurgery in 9/8/18 and nothing 

was recommended for additional therapy.  Cerebral Amyloid Angiopathy is a risk 

factor for dementia.


-


Plan:


- Trial of gabapentin 300 mg qhs for left face/arm paresthesias


- TTE to ensure no cardiac thrombus


- No further neurologic w/u needed at this time so neurology will sign off


- F/U in neurology clinic in 1-4 weeks to assess response





Objective: 





 Vital Signs











Temp Pulse Resp BP Pulse Ox


 


 36.6 C   68   16   124/97 H  91 L


 


 09/11/18 07:35  09/11/18 07:35  09/11/18 07:35  09/11/18 07:35  09/11/18 07:35








 











PT  14.1 SEC (12.0-15.0)   09/10/18  13:55    


 


INR  1.07  (0.83-1.16)   09/10/18  13:55    











Allergies/Adverse Reactions: 


 





No Known Allergies Allergy (Verified 09/10/18 20:08)

## 2018-11-06 ENCOUNTER — HOSPITAL ENCOUNTER (OUTPATIENT)
Dept: HOSPITAL 80 - FCPNEURO | Age: 78
End: 2018-11-06
Attending: PSYCHIATRY & NEUROLOGY
Payer: COMMERCIAL

## 2018-11-06 DIAGNOSIS — R20.2: Primary | ICD-10-CM

## 2018-11-06 NOTE — CPEEG
DATE OF STUDY:  11/06/2018



INTERPRETATION:  This EEG contains a mild degree of diffuse nonspecific 
slowing.  These findings would be consistent with a mild diffuse disturbance of 
cerebral function.  There were no definite potentially epileptogenic 
abnormalities present in the awake or sleep recordings.



REPORT:  This EEG contains 8-9 Hz alpha activity to the posterior head regions.
  There was a mild degree of diffuse theta slowing present in the background 
activity.  During drowsiness, there was more prominent slowing over the 
bitemporal head regions, which can be a normal drowsy variant.  There was no 
abnormal activation at rest, during hyperventilation or photic stimulation.  
The patient became drowsy and fell asleep during the study.  There was no 
definite abnormal epileptiform activation during drowsiness, sleep or at times 
of arousal.





Job #:  [id number]

MTDD